# Patient Record
(demographics unavailable — no encounter records)

---

## 2025-04-18 NOTE — DISCUSSION/SUMMARY
[FreeTextEntry1] : SOB check echo if able to approve and schedule. HTN - JEFF  and NATHALY had an extensive discussion regarding his blood pressure management. Patient will continue taking current medications in addition to maintaining a low Na diet, with periodic b/p checks at home. HLD JEFF and NATHALY discussed his lipid panel and individualized target LDL goal. At this point, will do diet and exercise with anticipation of re-evaluating labs in 3-6 months

## 2025-04-18 NOTE — REASON FOR VISIT
[Symptom and Test Evaluation] : symptom and test evaluation [FreeTextEntry1] : 51M comes in for a visit. Patient remarks on dyspnea. This is often noted with activity. Symptoms always improve at rest. No issues with statin. He is here to follow up on echocardiogram. Echocardiogram scheduled by  to July.

## 2025-07-10 NOTE — REASON FOR VISIT
[Symptom and Test Evaluation] : symptom and test evaluation [FreeTextEntry1] : 51M comes in for a re-evaluation. He lost a fair amount of weight. No chest pain noted. No issues with medications.  Echo completed today.

## 2025-07-10 NOTE — DISCUSSION/SUMMARY
[FreeTextEntry1] : CP/SOB echo reviewed. he feels better. Inclined towards a conservative follow up in this patient. We had a careful discussion regarding diet and exercise. Will be happy to re-evaluate. HTN refill medications HLD will see PMD for labs, however, labs from Feb looked reassuring. Check carotid us if able to approve and schedule.